# Patient Record
Sex: FEMALE | Race: ASIAN | NOT HISPANIC OR LATINO | Employment: FULL TIME | URBAN - METROPOLITAN AREA
[De-identification: names, ages, dates, MRNs, and addresses within clinical notes are randomized per-mention and may not be internally consistent; named-entity substitution may affect disease eponyms.]

---

## 2019-10-07 ENCOUNTER — HISTORICAL (OUTPATIENT)
Dept: ADMINISTRATIVE | Facility: HOSPITAL | Age: 19
End: 2019-10-07

## 2019-10-10 LAB — FINAL CULTURE: NORMAL

## 2022-04-10 ENCOUNTER — HISTORICAL (OUTPATIENT)
Dept: ADMINISTRATIVE | Facility: HOSPITAL | Age: 22
End: 2022-04-10

## 2022-04-26 VITALS
HEIGHT: 61 IN | SYSTOLIC BLOOD PRESSURE: 90 MMHG | DIASTOLIC BLOOD PRESSURE: 62 MMHG | WEIGHT: 85.56 LBS | BODY MASS INDEX: 16.15 KG/M2 | OXYGEN SATURATION: 98 %

## 2023-05-08 DIAGNOSIS — R42 DIZZINESS: Primary | ICD-10-CM

## 2023-05-08 DIAGNOSIS — R20.0 NUMBNESS: ICD-10-CM

## 2023-07-06 ENCOUNTER — PATIENT MESSAGE (OUTPATIENT)
Dept: NEUROLOGY | Facility: CLINIC | Age: 23
End: 2023-07-06
Payer: COMMERCIAL

## 2023-09-18 ENCOUNTER — OFFICE VISIT (OUTPATIENT)
Dept: NEUROLOGY | Facility: CLINIC | Age: 23
End: 2023-09-18
Payer: COMMERCIAL

## 2023-09-18 ENCOUNTER — LAB VISIT (OUTPATIENT)
Dept: LAB | Facility: HOSPITAL | Age: 23
End: 2023-09-18
Attending: PSYCHIATRY & NEUROLOGY
Payer: COMMERCIAL

## 2023-09-18 VITALS
DIASTOLIC BLOOD PRESSURE: 58 MMHG | HEART RATE: 84 BPM | HEIGHT: 62 IN | BODY MASS INDEX: 19.14 KG/M2 | SYSTOLIC BLOOD PRESSURE: 90 MMHG | WEIGHT: 104 LBS

## 2023-09-18 DIAGNOSIS — H54.7 UNSPECIFIED VISUAL LOSS: ICD-10-CM

## 2023-09-18 DIAGNOSIS — R20.0 NUMBNESS: ICD-10-CM

## 2023-09-18 DIAGNOSIS — R42 DIZZINESS AND GIDDINESS: Primary | ICD-10-CM

## 2023-09-18 DIAGNOSIS — R42 DIZZINESS: ICD-10-CM

## 2023-09-18 LAB
CK SERPL-CCNC: 45 U/L (ref 29–168)
ERYTHROCYTE [SEDIMENTATION RATE] IN BLOOD: 18 MM/HR (ref 0–20)
EST. AVERAGE GLUCOSE BLD GHB EST-MCNC: 85.3 MG/DL
FERRITIN SERPL-MCNC: 32.5 NG/ML (ref 4.63–204)
HBA1C MFR BLD: 4.6 %
HIV 1+2 AB+HIV1 P24 AG SERPL QL IA: NONREACTIVE
VIT B12 SERPL-MCNC: 1037 PG/ML (ref 213–816)

## 2023-09-18 PROCEDURE — 86255 FLUORESCENT ANTIBODY SCREEN: CPT

## 2023-09-18 PROCEDURE — 3074F SYST BP LT 130 MM HG: CPT | Mod: CPTII,S$GLB,, | Performed by: PSYCHIATRY & NEUROLOGY

## 2023-09-18 PROCEDURE — 82607 VITAMIN B-12: CPT

## 2023-09-18 PROCEDURE — 3078F PR MOST RECENT DIASTOLIC BLOOD PRESSURE < 80 MM HG: ICD-10-PCS | Mod: CPTII,S$GLB,, | Performed by: PSYCHIATRY & NEUROLOGY

## 2023-09-18 PROCEDURE — 86235 NUCLEAR ANTIGEN ANTIBODY: CPT

## 2023-09-18 PROCEDURE — 83519 RIA NONANTIBODY: CPT

## 2023-09-18 PROCEDURE — 3008F PR BODY MASS INDEX (BMI) DOCUMENTED: ICD-10-PCS | Mod: CPTII,S$GLB,, | Performed by: PSYCHIATRY & NEUROLOGY

## 2023-09-18 PROCEDURE — 1159F MED LIST DOCD IN RCRD: CPT | Mod: CPTII,S$GLB,, | Performed by: PSYCHIATRY & NEUROLOGY

## 2023-09-18 PROCEDURE — 87389 HIV-1 AG W/HIV-1&-2 AB AG IA: CPT

## 2023-09-18 PROCEDURE — 36415 COLL VENOUS BLD VENIPUNCTURE: CPT

## 2023-09-18 PROCEDURE — 3078F DIAST BP <80 MM HG: CPT | Mod: CPTII,S$GLB,, | Performed by: PSYCHIATRY & NEUROLOGY

## 2023-09-18 PROCEDURE — 86256 FLUORESCENT ANTIBODY TITER: CPT

## 2023-09-18 PROCEDURE — 82550 ASSAY OF CK (CPK): CPT

## 2023-09-18 PROCEDURE — 99999 PR PBB SHADOW E&M-EST. PATIENT-LVL IV: CPT | Mod: PBBFAC,,, | Performed by: PSYCHIATRY & NEUROLOGY

## 2023-09-18 PROCEDURE — 99999 PR PBB SHADOW E&M-EST. PATIENT-LVL IV: ICD-10-PCS | Mod: PBBFAC,,, | Performed by: PSYCHIATRY & NEUROLOGY

## 2023-09-18 PROCEDURE — 82728 ASSAY OF FERRITIN: CPT

## 2023-09-18 PROCEDURE — 1160F RVW MEDS BY RX/DR IN RCRD: CPT | Mod: CPTII,S$GLB,, | Performed by: PSYCHIATRY & NEUROLOGY

## 2023-09-18 PROCEDURE — 83036 HEMOGLOBIN GLYCOSYLATED A1C: CPT

## 2023-09-18 PROCEDURE — 86039 ANTINUCLEAR ANTIBODIES (ANA): CPT

## 2023-09-18 PROCEDURE — 3074F PR MOST RECENT SYSTOLIC BLOOD PRESSURE < 130 MM HG: ICD-10-PCS | Mod: CPTII,S$GLB,, | Performed by: PSYCHIATRY & NEUROLOGY

## 2023-09-18 PROCEDURE — 3044F PR MOST RECENT HEMOGLOBIN A1C LEVEL <7.0%: ICD-10-PCS | Mod: CPTII,S$GLB,, | Performed by: PSYCHIATRY & NEUROLOGY

## 2023-09-18 PROCEDURE — 1160F PR REVIEW ALL MEDS BY PRESCRIBER/CLIN PHARMACIST DOCUMENTED: ICD-10-PCS | Mod: CPTII,S$GLB,, | Performed by: PSYCHIATRY & NEUROLOGY

## 2023-09-18 PROCEDURE — 99205 OFFICE O/P NEW HI 60 MIN: CPT | Mod: S$GLB,,, | Performed by: PSYCHIATRY & NEUROLOGY

## 2023-09-18 PROCEDURE — 3044F HG A1C LEVEL LT 7.0%: CPT | Mod: CPTII,S$GLB,, | Performed by: PSYCHIATRY & NEUROLOGY

## 2023-09-18 PROCEDURE — 1159F PR MEDICATION LIST DOCUMENTED IN MEDICAL RECORD: ICD-10-PCS | Mod: CPTII,S$GLB,, | Performed by: PSYCHIATRY & NEUROLOGY

## 2023-09-18 PROCEDURE — 82525 ASSAY OF COPPER: CPT

## 2023-09-18 PROCEDURE — 99205 PR OFFICE/OUTPT VISIT, NEW, LEVL V, 60-74 MIN: ICD-10-PCS | Mod: S$GLB,,, | Performed by: PSYCHIATRY & NEUROLOGY

## 2023-09-18 PROCEDURE — 85652 RBC SED RATE AUTOMATED: CPT

## 2023-09-18 PROCEDURE — 3008F BODY MASS INDEX DOCD: CPT | Mod: CPTII,S$GLB,, | Performed by: PSYCHIATRY & NEUROLOGY

## 2023-09-18 RX ORDER — DEXTROAMPHETAMINE SACCHARATE, AMPHETAMINE ASPARTATE MONOHYDRATE, DEXTROAMPHETAMINE SULFATE AND AMPHETAMINE SULFATE 2.5; 2.5; 2.5; 2.5 MG/1; MG/1; MG/1; MG/1
15 CAPSULE, EXTENDED RELEASE ORAL 2 TIMES DAILY
COMMUNITY

## 2023-09-18 RX ORDER — LANOLIN ALCOHOL/MO/W.PET/CERES
400 CREAM (GRAM) TOPICAL DAILY
COMMUNITY
End: 2023-09-25

## 2023-09-18 RX ORDER — MIRTAZAPINE 7.5 MG/1
15 TABLET, FILM COATED ORAL NIGHTLY
COMMUNITY
End: 2023-09-25

## 2023-09-18 NOTE — PROGRESS NOTES
Chief Complaint   Patient presents with    Dizziness/numbness     NP: Referred by Dr. Ward Akers for neuro consult to evaluate for dizziness and numbness: Lately, has been having episodes of dizziness on a daily basis. This year fainted at 3 times. Randomly hands and legs get numb lasting for about a minute or two. Has been getting nerve twitches in arms and feet.         This is a 23 y.o. female  here for dizziness.  Patient reports chronic sensation of vertigo that started in March.  Symptoms were gradual onset.  No specific trigger at that time.  Symptoms are not positional.  She feels a sensation of movement when she is upright sitting or lying.  She also has had an episode of fainting while taking a shower earlier this year in April.  She recalls becoming lightheaded and seeing spots and then passing out in the shower.  When trying to fix the shower curtain she feels that she passed out again and when getting out of the shower.  She did not urinate on herself.  She intermittently has brief feelings of numbness in an arm or leg or in her feet.  This only lasts for a few seconds to minutes and then usually goes away.  She is seen both Cardiology and the diagnosis of POTS was discussed.  She underwent a workup which was essentially normal.  Also saw ENT and did vestibular testing which she tells me was normal.  She underwent an MRI of the brain while she was in the ER in [place] which was reportedly normal as well.  She takes Zoloft and then takes Remeron for sleep.  She was previously on Ambien but has since stopped that medication.  Takes Adderall and has been taking for some time prior to dizziness starting.  Denies any ringing in the ear.  But does report intermittent hearing loss.  However reports audiology testing was normal.  Denies headaches        Medication List with Changes/Refills   Current Medications    CLONAZEPAM (KLONOPIN) 0.5 MG TABLET    Take 0.5 mg by mouth every evening.     DEXTROAMPHETAMINE-AMPHETAMINE (ADDERALL XR) 10 MG 24 HR CAPSULE    Take 15 mg by mouth 2 (two) times a day.    ETONOGESTREL (NEXPLANON SDRM)    by Subdermal route.    LEVOCETIRIZINE (XYZAL) 5 MG TABLET    Take 5 mg by mouth every evening.    MAGNESIUM OXIDE (MAG-OX) 400 MG (241.3 MG MAGNESIUM) TABLET    Take 400 mg by mouth once daily.    MIRTAZAPINE (REMERON) 7.5 MG TAB    Take 15 mg by mouth every evening.    SERTRALINE HCL (SERTRALINE ORAL)    Take 150 mg by mouth once daily.    ZOLPIDEM (AMBIEN) 5 MG TAB    Take 5 mg by mouth nightly as needed.        Past Surgical History:   Procedure Laterality Date    TEAR DUCT SURGERY          Past Medical History:   Diagnosis Date    ADHD     Anxiety disorder, unspecified     Depression     Hashimoto's disease     Hyperinsulinemia     PTSD (post-traumatic stress disorder)     Thyroiditis, unspecified         Family History   Problem Relation Age of Onset    Hypertension Mother     Hyperlipidemia Father     Diabetes Father     Alcohol abuse Father         Social History     Socioeconomic History    Marital status: Single   Tobacco Use    Smoking status: Never    Smokeless tobacco: Never   Substance and Sexual Activity    Alcohol use: Not Currently    Drug use: Never          Review of Systems  Review of Systems   Constitutional: Negative for appetite change.   HENT: Negative for sinus pressure and sore throat.    Eyes: Negative for visual disturbance.   Respiratory: Negative for cough and shortness of breath.    Cardiovascular: Negative for chest pain.   Gastrointestinal: Negative for diarrhea and nausea.   Endocrine: Negative for cold intolerance and heat intolerance.   Genitourinary: Negative for dysuria.   Musculoskeletal: Negative for arthralgias and myalgias.   Skin: Negative for rash.   Allergic/Immunologic: Negative for immunocompromised state.   Neurological:        See HPI   Hematological: Does not bruise/bleed easily.   Psychiatric/Behavioral: Negative for  hallucinations.      General: alert and oriented, no acute distress, no audible wheezes, pulse intact, no edema    Vitals:    09/18/23 0803   BP: (!) 90/58   Pulse: 84        Cognition and Comprehension  Speech and language intact  Follows commands  Speech fluent  Attention intact  Memory for recent events intact from history taking  Affect pleasant  Fund of knowledge adequate    Cranial nerves  II. Optic: Visual fields full to confrontation both eyes  III, IV, VI. Oculomotor: Intact, Pupils equal, round and reactive to light, no nystagmus  V. Trigeminal: sensation to light touch normal  VII. No facial asymmetry or facial weakness  VIII. Hearing intact to spoken voice  IX/X. Glossopharyngeal/Vagus: Voice normal, palate rises symmetrically  XI. Axillary: Shoulder shrug normal  XII. Hypoglossal: Intact    Muscle Strength and Tone  Normal upper extremity tone  Normal lower extremity tone  Normal upper extremity strength  Normal lower extremity strength    Sensation  Intact to light touch and temperature    Reflexes  Normal and symmetric    Coordination and Gait  Finger to nose normal  Gait normal       Charmaine was seen today for dizziness/numbness.    Diagnoses and all orders for this visit:    Dizziness and giddiness  -     MRA Brain without contrast; Future    Dizziness  -     Ambulatory referral/consult to Neurology  -     HIV 1/2 Ag/Ab (4th Gen); Future  -     RACHEL IgG by IFA; Future  -     CK; Future  -     Ferritin; Future  -     Hemoglobin A1C; Future  -     Copper, Serum; Future  -     ANTI-SSA + ANTI-SSB; Future  -     Sedimentation rate; Future  -     Vitamin B12; Future  -     [place] GENERIC ORDERABLE DYS2 (dysautonomia autoimmune eval); Future    Numbness  -     Ambulatory referral/consult to Neurology    Unspecified visual loss  -     MRA Brain without contrast; Future     Neurologic exam is normal.  Reportedly MRI normal.  Her history and normal workup and exam rules out typical neurologic causes of chronic  dizziness like vestibular migraine and multiple sclerosis.  Given family history of cerebral aneurysm, we will check MRA of the brain, labs for neuropathy (although no signs of neuropathy on exam). Interested in neuro-otology referral in [place] (works there and here )

## 2023-09-19 LAB
ANA SER QL HEP2 SUBST: NORMAL
COPPER SERPL-MCNC: 99 MCG/DL (ref 77–206)

## 2023-09-20 LAB
SSA(RO) AB QUANT (OHS): <0.4 U/ML
SSB(LA) AB QUANT (OHS): <0.4 U/ML

## 2023-09-21 ENCOUNTER — TELEPHONE (OUTPATIENT)
Dept: NEUROLOGY | Facility: CLINIC | Age: 23
End: 2023-09-21
Payer: COMMERCIAL

## 2023-09-21 DIAGNOSIS — I67.1 INTRACRANIAL ANEURYSM: Primary | ICD-10-CM

## 2023-09-25 ENCOUNTER — OFFICE VISIT (OUTPATIENT)
Dept: NEUROLOGY | Facility: CLINIC | Age: 23
End: 2023-09-25
Payer: COMMERCIAL

## 2023-09-25 VITALS
BODY MASS INDEX: 19.14 KG/M2 | HEIGHT: 62 IN | WEIGHT: 104 LBS | SYSTOLIC BLOOD PRESSURE: 110 MMHG | DIASTOLIC BLOOD PRESSURE: 68 MMHG

## 2023-09-25 DIAGNOSIS — I67.1 INTRACRANIAL ANEURYSM: Primary | ICD-10-CM

## 2023-09-25 DIAGNOSIS — R42 DIZZINESS, NONSPECIFIC: ICD-10-CM

## 2023-09-25 DIAGNOSIS — Q79.60 EDS (EHLERS-DANLOS SYNDROME): Primary | ICD-10-CM

## 2023-09-25 DIAGNOSIS — Z82.49 FAMILY HISTORY OF CEREBRAL ANEURYSM: ICD-10-CM

## 2023-09-25 PROCEDURE — 3008F BODY MASS INDEX DOCD: CPT | Mod: CPTII,S$GLB,, | Performed by: PSYCHIATRY & NEUROLOGY

## 2023-09-25 PROCEDURE — 1159F PR MEDICATION LIST DOCUMENTED IN MEDICAL RECORD: ICD-10-PCS | Mod: CPTII,S$GLB,, | Performed by: PSYCHIATRY & NEUROLOGY

## 2023-09-25 PROCEDURE — 3044F PR MOST RECENT HEMOGLOBIN A1C LEVEL <7.0%: ICD-10-PCS | Mod: CPTII,S$GLB,, | Performed by: PSYCHIATRY & NEUROLOGY

## 2023-09-25 PROCEDURE — 3074F PR MOST RECENT SYSTOLIC BLOOD PRESSURE < 130 MM HG: ICD-10-PCS | Mod: CPTII,S$GLB,, | Performed by: PSYCHIATRY & NEUROLOGY

## 2023-09-25 PROCEDURE — 1160F RVW MEDS BY RX/DR IN RCRD: CPT | Mod: CPTII,S$GLB,, | Performed by: PSYCHIATRY & NEUROLOGY

## 2023-09-25 PROCEDURE — 99999 PR PBB SHADOW E&M-EST. PATIENT-LVL III: CPT | Mod: PBBFAC,,, | Performed by: PSYCHIATRY & NEUROLOGY

## 2023-09-25 PROCEDURE — 3078F PR MOST RECENT DIASTOLIC BLOOD PRESSURE < 80 MM HG: ICD-10-PCS | Mod: CPTII,S$GLB,, | Performed by: PSYCHIATRY & NEUROLOGY

## 2023-09-25 PROCEDURE — 3074F SYST BP LT 130 MM HG: CPT | Mod: CPTII,S$GLB,, | Performed by: PSYCHIATRY & NEUROLOGY

## 2023-09-25 PROCEDURE — 99215 PR OFFICE/OUTPT VISIT, EST, LEVL V, 40-54 MIN: ICD-10-PCS | Mod: S$GLB,,, | Performed by: PSYCHIATRY & NEUROLOGY

## 2023-09-25 PROCEDURE — 3078F DIAST BP <80 MM HG: CPT | Mod: CPTII,S$GLB,, | Performed by: PSYCHIATRY & NEUROLOGY

## 2023-09-25 PROCEDURE — 99215 OFFICE O/P EST HI 40 MIN: CPT | Mod: S$GLB,,, | Performed by: PSYCHIATRY & NEUROLOGY

## 2023-09-25 PROCEDURE — 99999 PR PBB SHADOW E&M-EST. PATIENT-LVL III: ICD-10-PCS | Mod: PBBFAC,,, | Performed by: PSYCHIATRY & NEUROLOGY

## 2023-09-25 PROCEDURE — 1160F PR REVIEW ALL MEDS BY PRESCRIBER/CLIN PHARMACIST DOCUMENTED: ICD-10-PCS | Mod: CPTII,S$GLB,, | Performed by: PSYCHIATRY & NEUROLOGY

## 2023-09-25 PROCEDURE — 3044F HG A1C LEVEL LT 7.0%: CPT | Mod: CPTII,S$GLB,, | Performed by: PSYCHIATRY & NEUROLOGY

## 2023-09-25 PROCEDURE — 1159F MED LIST DOCD IN RCRD: CPT | Mod: CPTII,S$GLB,, | Performed by: PSYCHIATRY & NEUROLOGY

## 2023-09-25 PROCEDURE — 3008F PR BODY MASS INDEX (BMI) DOCUMENTED: ICD-10-PCS | Mod: CPTII,S$GLB,, | Performed by: PSYCHIATRY & NEUROLOGY

## 2023-09-25 RX ORDER — DEXTROMETHORPHAN HYDROBROMIDE, GUAIFENESIN, PHENYLEPHRINE HYDROCHLORIDE 17.5; 400; 1 MG/1; MG/1; MG/1
1 TABLET ORAL 3 TIMES DAILY
COMMUNITY
Start: 2023-09-06 | End: 2023-09-25

## 2023-09-25 RX ORDER — MIRTAZAPINE 15 MG/1
22.5 TABLET, FILM COATED ORAL NIGHTLY
COMMUNITY
Start: 2023-08-12

## 2023-09-25 RX ORDER — MULTIVITAMIN
1 TABLET ORAL DAILY
COMMUNITY

## 2023-09-25 NOTE — PROGRESS NOTES
Neurology Office Visit  Neurology  HPI:    Charmaine Mariano is a 23 y.o. female who is referred to vascular neurology clinic after she was noted to have multiple aneurysms on a recent MRA which was performed due to a significant family history of aneurysms on mothers side. She was referred by Dr Briceño who was seeing her for chronic dizziness/vertigo and passing out spells. She states that vertigo is present irrespective of the position or activity. She has not had passing out spells since April. She was evaluated in [place] when she had multiple spells of passing out and vertigo. Since then, she has seen ENT and Cardiology and underwent extensive testing and ruled out ear and cardiac etiology for her spells including BPPV, POTS. She is currently undergoing work up with Dr Briceño for her vertigo. She reports occasional headaches but denies daily headaches or medications. She states that she occasionally has tingling in bilateral arms/legs but denies any weakness, numbness, vision or speech changes. She denies smoking, drug use and takes adderall on a daily basis along with zoloft and mirtazapine.     She reports significant family history of ruptured intracranial aneurysms on mother's side including mother, maternal aunt, maternal uncle. One of her cousin is diagnosed with Jordana Danlos syndrome.  She has a history of autoimmune thyroiditis but does not take any medications. She has an endocrinologist and a psychiatrist who manage her other conditions.     ROS:  Review of Systems   Constitutional:  Negative for malaise/fatigue and weight loss.   Eyes:  Positive for blurred vision. Negative for double vision.   Neurological:  Positive for sensory change and headaches. Negative for speech change and focal weakness.   Psychiatric/Behavioral:  Negative for memory loss. The patient is nervous/anxious.         Past Medical History:   Diagnosis Date    ADHD     Anxiety disorder, unspecified     Depression     Hashimoto's  disease     Hyperinsulinemia     PTSD (post-traumatic stress disorder)     Thyroiditis, unspecified      Past Surgical History:   Procedure Laterality Date    TEAR DUCT SURGERY       Family History   Problem Relation Age of Onset    Hypertension Mother     Hyperlipidemia Father     Diabetes Father     Alcohol abuse Father      Review of patient's allergies indicates:   Allergen Reactions    Bactrim [sulfamethoxazole-trimethoprim]        Current Outpatient Medications:     dextroamphetamine-amphetamine (ADDERALL XR) 10 MG 24 hr capsule, Take 15 mg by mouth 2 (two) times a day., Disp: , Rfl:     etonogestrel (NEXPLANON SDRM), by Subdermal route., Disp: , Rfl:     mirtazapine (REMERON) 15 MG tablet, Take 22.5 mg by mouth every evening., Disp: , Rfl:     multivitamin (ONE DAILY MULTIVITAMIN) per tablet, Take 1 tablet by mouth once daily., Disp: , Rfl:     sertraline HCl (SERTRALINE ORAL), Take 150 mg by mouth once daily., Disp: , Rfl:   Outpatient Medications Marked as Taking for the 9/25/23 encounter (Office Visit) with Eriberto Celis MD   Medication Sig Dispense Refill    dextroamphetamine-amphetamine (ADDERALL XR) 10 MG 24 hr capsule Take 15 mg by mouth 2 (two) times a day.      etonogestrel (NEXPLANON SDRM) by Subdermal route.      mirtazapine (REMERON) 15 MG tablet Take 22.5 mg by mouth every evening.      multivitamin (ONE DAILY MULTIVITAMIN) per tablet Take 1 tablet by mouth once daily.      sertraline HCl (SERTRALINE ORAL) Take 150 mg by mouth once daily.       Social History     Tobacco Use    Smoking status: Never    Smokeless tobacco: Never   Substance Use Topics    Alcohol use: Not Currently    Drug use: Never         Vitals:    09/25/23 1329   BP: 110/68       Physical Exam:  HEENT NC/AT  CV RRR, no tenderness  Resp clear without dyspnea  GI soft  Ext no edema. Hypermobile joints including thumb.     Neuro:  Alert & oriented x 3  EOMI, PERRLA, visual field intact in all 4 quadrants, no nystagmus. Diplopia  (inconsistent) on right gaze and upper gaze but disappears on left gaze.   Face symmetric, speech clear and fluent, facial sensation equal bilaterally  Tongue midline  Strength 5/5 in bilateral upper and lower extremities   Sensation intact and equal bilaterally  Gait steady and balanced     Imaging:  Reviewed MRA head     Assessment:   Ms Mariano is a pleasant 23 year female who is referred to vascular neurology clinic due to a recent finding of intracranial aneurysms seen on MRA head. She has a significant family history of ruptured aneurysms and Jordana Danlos syndrome and is currently undergoing work up for vertigo and passing out spells with Dr Briceño.     Her examination is non focal except for an inconsistent diplopia on right and upper gaze.     I discussed and reviewed the MRA findings. I also discussed the natural history of aneurysms and high risks features for rupture including size, location, shape and family history. I also discussed external factors such as HTN, smoking, drug use as contributing factors to risk of aneurysm rupture. I discussed the need for a diagnostic cerebral angiogram to further evaluate the intracranial aneurysms seen on MRA and need for close surveillance. I discussed the risks of stroke, hemorrhage, pain with the angiogram and plan of performing a DSA next week.       Plan:   - DSA next week  - genetics referral to look for hereditary conditions predisposing her to aneurysms such as Jordana Danlos syndrome    Eriberto Celis MD  Vascular and Interventional Neurology

## 2023-09-29 LAB — MAYO GENERIC ORDERABLE RESULT: NORMAL

## 2023-10-02 ENCOUNTER — TELEPHONE (OUTPATIENT)
Dept: NEUROLOGY | Facility: CLINIC | Age: 23
End: 2023-10-02

## 2023-10-02 NOTE — PROGRESS NOTES
Labs normal overall, ferritin (iron stores) were relatively low. Sometimes this can cause restless leg symptoms. If she is not having these sx, does not need to supplement

## 2023-10-02 NOTE — TELEPHONE ENCOUNTER
----- Message from Akosua Briceño MD sent at 10/2/2023  8:21 AM CDT -----  Labs normal overall, ferritin (iron stores) were relatively low. Sometimes this can cause restless leg symptoms. If she is not having these sx, does not need to supplement

## 2023-10-02 NOTE — TELEPHONE ENCOUNTER
Pt informed of overall normal labs her ferritin was relatively low Pt denies restless leg type symptoms requesting labs to be faxed to PCP and endocrinologist will fax to care teams

## 2023-10-03 ENCOUNTER — TELEPHONE (OUTPATIENT)
Dept: NEUROLOGY | Facility: CLINIC | Age: 23
End: 2023-10-03
Payer: COMMERCIAL

## 2023-10-03 NOTE — TELEPHONE ENCOUNTER
Received call from Dr. Celina Amezquita's office (genetics) regarding pt's referral.  They are unable to accept referral as they specialize in pediatric genetics.  Pt has also been referred to Dr. Chi Yu for genetics, referral is pending acceptance.

## 2023-10-04 ENCOUNTER — HOSPITAL ENCOUNTER (OUTPATIENT)
Dept: INTERVENTIONAL RADIOLOGY/VASCULAR | Facility: HOSPITAL | Age: 23
Discharge: HOME | End: 2023-10-04
Attending: PSYCHIATRY & NEUROLOGY | Admitting: PSYCHIATRY & NEUROLOGY
Payer: COMMERCIAL

## 2023-10-04 VITALS
DIASTOLIC BLOOD PRESSURE: 55 MMHG | TEMPERATURE: 98 F | HEART RATE: 79 BPM | BODY MASS INDEX: 19.19 KG/M2 | SYSTOLIC BLOOD PRESSURE: 100 MMHG | HEIGHT: 62 IN | WEIGHT: 104.25 LBS | OXYGEN SATURATION: 96 %

## 2023-10-04 DIAGNOSIS — I67.1 INTRACRANIAL ANEURYSM: ICD-10-CM

## 2023-10-04 DIAGNOSIS — Q79.60 EDS (EHLERS-DANLOS SYNDROME): Primary | ICD-10-CM

## 2023-10-04 LAB
ANION GAP SERPL CALC-SCNC: 5 MEQ/L
B-HCG UR QL: NEGATIVE
BASOPHILS # BLD AUTO: 0.04 X10(3)/MCL
BASOPHILS NFR BLD AUTO: 0.9 %
BUN SERPL-MCNC: 13.9 MG/DL (ref 7–18.7)
CALCIUM SERPL-MCNC: 9.2 MG/DL (ref 8.4–10.2)
CHLORIDE SERPL-SCNC: 109 MMOL/L (ref 98–107)
CO2 SERPL-SCNC: 25 MMOL/L (ref 22–29)
CREAT SERPL-MCNC: 0.73 MG/DL (ref 0.55–1.02)
CREAT/UREA NIT SERPL: 19
CTP QC/QA: YES
EOSINOPHIL # BLD AUTO: 0.03 X10(3)/MCL (ref 0–0.9)
EOSINOPHIL NFR BLD AUTO: 0.7 %
ERYTHROCYTE [DISTWIDTH] IN BLOOD BY AUTOMATED COUNT: 12.4 % (ref 11.5–17)
GFR SERPLBLD CREATININE-BSD FMLA CKD-EPI: >60 MLS/MIN/1.73/M2
GLUCOSE SERPL-MCNC: 87 MG/DL (ref 74–100)
HCT VFR BLD AUTO: 40.1 % (ref 37–47)
HGB BLD-MCNC: 13.8 G/DL (ref 12–16)
IMM GRANULOCYTES # BLD AUTO: 0.01 X10(3)/MCL (ref 0–0.04)
IMM GRANULOCYTES NFR BLD AUTO: 0.2 %
INR PPP: 1
LYMPHOCYTES # BLD AUTO: 1.05 X10(3)/MCL (ref 0.6–4.6)
LYMPHOCYTES NFR BLD AUTO: 23.1 %
MCH RBC QN AUTO: 33.9 PG (ref 27–31)
MCHC RBC AUTO-ENTMCNC: 34.4 G/DL (ref 33–36)
MCV RBC AUTO: 98.5 FL (ref 80–94)
MONOCYTES # BLD AUTO: 0.31 X10(3)/MCL (ref 0.1–1.3)
MONOCYTES NFR BLD AUTO: 6.8 %
NEUTROPHILS # BLD AUTO: 3.1 X10(3)/MCL (ref 2.1–9.2)
NEUTROPHILS NFR BLD AUTO: 68.3 %
NRBC BLD AUTO-RTO: 0 %
PLATELET # BLD AUTO: 239 X10(3)/MCL (ref 130–400)
PMV BLD AUTO: 10.2 FL (ref 7.4–10.4)
POTASSIUM SERPL-SCNC: 4.3 MMOL/L (ref 3.5–5.1)
PROTHROMBIN TIME: 13 SECONDS (ref 12.5–14.5)
RBC # BLD AUTO: 4.07 X10(6)/MCL (ref 4.2–5.4)
SODIUM SERPL-SCNC: 139 MMOL/L (ref 136–145)
WBC # SPEC AUTO: 4.54 X10(3)/MCL (ref 4.5–11.5)

## 2023-10-04 PROCEDURE — 36227 PLACE CATH XTRNL CAROTID: CPT | Mod: 50,,, | Performed by: PSYCHIATRY & NEUROLOGY

## 2023-10-04 PROCEDURE — 36224 PLACE CATH CAROTD ART: CPT | Mod: 50

## 2023-10-04 PROCEDURE — 99153 MOD SED SAME PHYS/QHP EA: CPT | Performed by: PSYCHIATRY & NEUROLOGY

## 2023-10-04 PROCEDURE — 36226 PLACE CATH VERTEBRAL ART: CPT | Mod: 51,50,, | Performed by: PSYCHIATRY & NEUROLOGY

## 2023-10-04 PROCEDURE — 36227 PLACE CATH XTRNL CAROTID: CPT | Mod: 50 | Performed by: PSYCHIATRY & NEUROLOGY

## 2023-10-04 PROCEDURE — C1894 INTRO/SHEATH, NON-LASER: HCPCS | Performed by: PSYCHIATRY & NEUROLOGY

## 2023-10-04 PROCEDURE — 76937 US GUIDE VASCULAR ACCESS: CPT | Mod: 26,,, | Performed by: PSYCHIATRY & NEUROLOGY

## 2023-10-04 PROCEDURE — 25000003 PHARM REV CODE 250: Performed by: PSYCHIATRY & NEUROLOGY

## 2023-10-04 PROCEDURE — 99152 MOD SED SAME PHYS/QHP 5/>YRS: CPT | Mod: ,,, | Performed by: PSYCHIATRY & NEUROLOGY

## 2023-10-04 PROCEDURE — 80048 BASIC METABOLIC PNL TOTAL CA: CPT | Performed by: PSYCHIATRY & NEUROLOGY

## 2023-10-04 PROCEDURE — 36226 PLACE CATH VERTEBRAL ART: CPT | Mod: 50 | Performed by: PSYCHIATRY & NEUROLOGY

## 2023-10-04 PROCEDURE — 36224 PLACE CATH CAROTD ART: CPT | Mod: 50,,, | Performed by: PSYCHIATRY & NEUROLOGY

## 2023-10-04 PROCEDURE — 85025 COMPLETE CBC W/AUTO DIFF WBC: CPT | Performed by: PSYCHIATRY & NEUROLOGY

## 2023-10-04 PROCEDURE — 81025 URINE PREGNANCY TEST: CPT | Performed by: PSYCHIATRY & NEUROLOGY

## 2023-10-04 PROCEDURE — 36224 PR ANGIO INTRCRNL ART +/- CERVIOCEREBRAL ARCH, INTRNL CAROTID ART, SELECTV CATH ,S&I: ICD-10-PCS | Mod: 50,,, | Performed by: PSYCHIATRY & NEUROLOGY

## 2023-10-04 PROCEDURE — 36227 PR ANGIO XTRNL CAROTD CIRC, XTRNL CAROTID, SELECTV CATH S&I: ICD-10-PCS | Mod: 50,,, | Performed by: PSYCHIATRY & NEUROLOGY

## 2023-10-04 PROCEDURE — C1769 GUIDE WIRE: HCPCS | Performed by: PSYCHIATRY & NEUROLOGY

## 2023-10-04 PROCEDURE — 99152 MOD SED SAME PHYS/QHP 5/>YRS: CPT | Performed by: PSYCHIATRY & NEUROLOGY

## 2023-10-04 PROCEDURE — C1887 CATHETER, GUIDING: HCPCS | Performed by: PSYCHIATRY & NEUROLOGY

## 2023-10-04 PROCEDURE — 99152 PR MOD CONSCIOUS SEDATION, SAME PHYS, 5+ YRS, FIRST 15 MIN: ICD-10-PCS | Mod: ,,, | Performed by: PSYCHIATRY & NEUROLOGY

## 2023-10-04 PROCEDURE — 25500020 PHARM REV CODE 255: Performed by: PSYCHIATRY & NEUROLOGY

## 2023-10-04 PROCEDURE — 63600175 PHARM REV CODE 636 W HCPCS: Performed by: PSYCHIATRY & NEUROLOGY

## 2023-10-04 PROCEDURE — 36226 PR ANGIO VERTEBRAL ARTERY +/- CERVIOCEREBRAL ARCH, VERTEBRAL ART, SELECTV CATH,S&I: ICD-10-PCS | Mod: 51,50,, | Performed by: PSYCHIATRY & NEUROLOGY

## 2023-10-04 PROCEDURE — 76937 PR  US GUIDE, VASCULAR ACCESS: ICD-10-PCS | Mod: 26,,, | Performed by: PSYCHIATRY & NEUROLOGY

## 2023-10-04 PROCEDURE — 85610 PROTHROMBIN TIME: CPT | Performed by: PSYCHIATRY & NEUROLOGY

## 2023-10-04 RX ORDER — MIDAZOLAM HYDROCHLORIDE 1 MG/ML
INJECTION INTRAMUSCULAR; INTRAVENOUS
Status: COMPLETED | OUTPATIENT
Start: 2023-10-04 | End: 2023-10-04

## 2023-10-04 RX ORDER — BUTALBITAL, ACETAMINOPHEN AND CAFFEINE 50; 325; 40 MG/1; MG/1; MG/1
1 TABLET ORAL ONCE
Status: COMPLETED | OUTPATIENT
Start: 2023-10-04 | End: 2023-10-04

## 2023-10-04 RX ORDER — SODIUM CHLORIDE 9 MG/ML
INJECTION, SOLUTION INTRAVENOUS CONTINUOUS
Status: ACTIVE | OUTPATIENT
Start: 2023-10-04 | End: 2023-10-04

## 2023-10-04 RX ORDER — FENTANYL CITRATE 50 UG/ML
INJECTION, SOLUTION INTRAMUSCULAR; INTRAVENOUS
Status: COMPLETED | OUTPATIENT
Start: 2023-10-04 | End: 2023-10-04

## 2023-10-04 RX ORDER — ONDANSETRON 4 MG/1
8 TABLET, ORALLY DISINTEGRATING ORAL EVERY 8 HOURS PRN
Status: DISCONTINUED | OUTPATIENT
Start: 2023-10-04 | End: 2023-10-05 | Stop reason: HOSPADM

## 2023-10-04 RX ORDER — LIDOCAINE HYDROCHLORIDE 20 MG/ML
INJECTION, SOLUTION INFILTRATION; PERINEURAL
Status: COMPLETED | OUTPATIENT
Start: 2023-10-04 | End: 2023-10-04

## 2023-10-04 RX ORDER — SPIRONOLACTONE 25 MG/1
25 TABLET ORAL DAILY
COMMUNITY

## 2023-10-04 RX ORDER — SODIUM CHLORIDE 9 MG/ML
INJECTION, SOLUTION INTRAVENOUS ONCE
Status: DISCONTINUED | OUTPATIENT
Start: 2023-10-04 | End: 2023-10-05 | Stop reason: HOSPADM

## 2023-10-04 RX ORDER — ACETAMINOPHEN 325 MG/1
650 TABLET ORAL EVERY 4 HOURS PRN
Status: DISCONTINUED | OUTPATIENT
Start: 2023-10-04 | End: 2023-10-05 | Stop reason: HOSPADM

## 2023-10-04 RX ADMIN — BUTALBITAL, ACETAMINOPHEN, AND CAFFEINE 1 TABLET: 50; 325; 40 TABLET ORAL at 01:10

## 2023-10-04 RX ADMIN — FENTANYL CITRATE 50 MCG: 50 INJECTION INTRAMUSCULAR; INTRAVENOUS at 11:10

## 2023-10-04 RX ADMIN — IOPAMIDOL 100 ML: 755 INJECTION, SOLUTION INTRAVENOUS at 11:10

## 2023-10-04 RX ADMIN — LIDOCAINE HYDROCHLORIDE 5 ML: 20 INJECTION, SOLUTION INFILTRATION; PERINEURAL at 11:10

## 2023-10-04 RX ADMIN — ONDANSETRON 8 MG: 4 TABLET, ORALLY DISINTEGRATING ORAL at 12:10

## 2023-10-04 RX ADMIN — MIDAZOLAM 1 MG: 1 INJECTION INTRAMUSCULAR; INTRAVENOUS at 11:10

## 2023-10-04 RX ADMIN — ACETAMINOPHEN 650 MG: 325 TABLET ORAL at 12:10

## 2023-10-04 RX ADMIN — SODIUM CHLORIDE: 9 INJECTION, SOLUTION INTRAVENOUS at 01:10

## 2023-10-04 NOTE — PLAN OF CARE
Pt c/o severe headache on right side. Pt lying flat in bed in tears. States pain is 9, on 0-10 scale. Called for Dr. Celis, spoke to Guanaco RN, new orders noted to administer fiorcet. Dr. eClis assessed pt at bedside.     Also asked Dr. Celis for clarification of order for 6 hour bedrest. Dr. Celis states he wants patient flat for 2 hours and bedrest for 6 hours total.

## 2023-10-04 NOTE — OP NOTE
[facility]                       [address]                      [place] [address]     PATIENT NAME:Charmaine Mariano    YOB: 2000      DATE OF SURGERY:10/4/2023      SURGEON:  Eriberto Celis MD     PREOPERATIVE DIAGNOSIS:  concern for intracranial aneurysms     POSTOPERATIVE DIAGNOSIS:  no evidence of aneurysm, AVM, AV fistula, stenosis.     PROCEDURE PERFORMED:    Cerebral angiogram with catheter insertion in the following arteries:  Right common carotid, right internal carotid, right external carotid artery, right subclavian, right vertebral, left common carotid, left internal carotid, left external carotid artery, left subclavian, left vertebral.  Interpretation of images  3D cerebral angiogram of both carotid arteries injection with interpretation of films on a separate workstation  Ultrasound-guided right femoral artery access  Moderate conscious sedation for 60 minutes     LEVEL OF SEDATION:  Conscious sedation.  Sedation administered by independent   trained observer under attending supervision with continuous monitoring of the   patient's level of consciousness and physiologic status.     TOTAL INTRASERVICE SEDATION TIME:  60 minutes.     COMPLICATIONS:  None.    APPROACH:  Right femoral artery      VESSELS SELECTIVELY CATHETERIZED:   Right common carotid artery   Right internal carotid artery   Right external carotid artery   Left common carotid artery  Left internal carotid artery   Left external carotid artery   Right subclavian artery   Right vertebral artery  Left subclavian artery   Left vertebral artery    DEVICES USED:  5 Fr short sheath  5 Fr Angled catheter  035 glidewire  Micropuncture kit       INDICATION:  23 y.o. years old female with a strong family history of aneurysms presents for a cerebral angiogram after her MRA reported concern for intracranial aneurysms. She has vertigo and history of passing out spells and occasional  headches.     DETAILED DESCRIPTION:      Risks/benefits were thoroughly reviewed with patient/family prior to the procedure.  Risks inclusive but not limited to death, stroke, contrast reaction/nephropathy, access/vascular injury, and other unforeseen risks.  Patient/family provided informed consent.  Patient supine on the angio table, groin prepped and draped in routine fashion.  Moderate conscious sedation was administered along with local anesthesia.    Under the ultrasound guidance, the right femoral artery was accessed using a micropuncture kit and an arterial sheath was placed using the modified Seldinger technique.  Diagnostic catheter telescoping over the 035 glidewire advanced to the arch and double flushed.  Enumerated vessels were then selectively catheterized and angiograms obtained as listed below.  Multiple cervical and intracranial runs were obtained in AP and lateral projection.  The films were reviewed and determined to be of good diagnostic quality.  Three-dimensional angiography was performed, the source data transferred to a separate workstation and personally processed by the angiographer. Specific views were then chosen that would best showed the lesion on multiple planes.  The selected coordinates were then transferred to the biplane angiographic suite and used to obtain confirmatory angiographic views of the lesion.  Diagnostic catheter and sheath were then withdrawn and hemostasis was obtained with the above closure device.  No immediate complications were noted.  Patient tolerated the procedure well.    Angiograms performed and findings:    Right femoral artery:  Sheath placement in the right common femoral artery above the femoral bifurcation.  There is no evidence of stenosis, dissection, atherosclerosis or contrast extravasation at the site of puncture.  Right internal carotid artery - cerebral & 3D angiogram:  The distal cervical, petrous, cavernous, supraclinoid segments of the right  ICA appear patent.  There is a posterior communicating artery supplying the PCA territory.  There is normal flow and branching noted in right DANIEL and MCA territory.  The capillary and venous phases appear unremarkable.NO evidence of aneurysm, AV fistula, AVM.   Right external carotid artery - cerebral:  The right external carotid artery and its branches appear unremarkable.  There is no evidence of AV fistula.  Right vertebral artery - cerebral:  Right vertebral artery is co-dominant.  Unremarkable distal cervical and intracranial segments of the right vertebral artery.  Right PICA, bilateral AICA, SCA, PCA are visualized.  The basilar artery appears patent.  There is normal capillary and venous phase.    Left internal carotid artery - cerebral:  The distal cervical, petrous, cavernous, supraclinoid segments of the left ICA appeared patent.  There is a posterior communicating artery supplying the PCA territory. There is a small 1.7mm infundibulum noted at the left PCOM origin. No obvious evidence of aneurysm. There is normal flow and branching noted in the left DANIEL and MCA territory.  The capillary and venous phases appear unremarkable.  Left external carotid artery - cerebral:  The left external carotid artery and its branches appear unremarkable.  There is no evidence of AV fistula.  Left subclavian artery - cervical:  Normal origin and cervical course of the left vertebral artery.    Left vertebral artery - cerebral:  Left vertebral artery is codominant.  Unremarkable distal cervical and intracranial segments of the left vertebral artery.  Left PICA, bilateral AICA, SCA, PCR visualized.  The basilar artery appears patent.  There is normal capillary and venous phase.      OVERALL IMPRESSION:  No evidence of aneurysm, AVM, AV fistula, stenosis.  Small 1.7mm left PCOM infundibulum.   Overall, unremarkable cerebral angiogram.        ______________________________  Eriberto Celis MD  Vascular and Interventional  Neurology

## 2023-10-04 NOTE — H&P
[facility] - Interventional Radiology  History & Physical    Subjective:      Chief Complaint/Reason for Admission: concern for intracranial aneurysms    Charmaine Mariano is a 23 y.o. female who is here for a Diagnostic cerebral angiogram due to concern for intracranial aneurysms. For full HPI please refer to the clinic note by Dr Celis     No acute events since the clinic visit. States that she continues to have occasional headaches but no passing out spells. She is off adderall now.     Past Medical History:   Diagnosis Date    ADHD     Anxiety disorder, unspecified     Depression     Hashimoto's disease     Hyperinsulinemia     PTSD (post-traumatic stress disorder)     Thyroiditis, unspecified      Past Surgical History:   Procedure Laterality Date    TEAR DUCT SURGERY       Family History   Problem Relation Age of Onset    Hypertension Mother     Hyperlipidemia Father     Diabetes Father     Alcohol abuse Father      Social History     Tobacco Use    Smoking status: Never    Smokeless tobacco: Never   Substance Use Topics    Alcohol use: Not Currently    Drug use: Never       (Not in a hospital admission)    Review of patient's allergies indicates:   Allergen Reactions    Bactrim [sulfamethoxazole-trimethoprim]         Review of Systems   Eyes:  Negative for blurred vision and double vision.   Neurological:  Positive for dizziness and headaches. Negative for speech change, focal weakness and loss of consciousness.       Objective:      Vital Signs (Most Recent)  Temp: 98.2 °F (36.8 °C) (10/04/23 0627)  Pulse: 70 (10/04/23 0627)  BP: (!) 93/55 (10/04/23 0627)  SpO2: 99 % (10/04/23 0627)    Vital Signs Range (Last 24H):  Temp:  [98.2 °F (36.8 °C)]   Pulse:  [70]   BP: (93)/(55)   SpO2:  [99 %]     Physical Exam  Alert & oriented x 3  EOMI, PERRLA, visual field intact in all 4 quadrants, no nystagmus or diplopia on lateral or vertical gaze.  Face symmetric, speech clear and fluent  Tongue midline, facial  sensation equal bilaterally  Strength 5/5 in bilateral upper and lower extremities   Sensation intact and equal bilaterally  Gait steady and balanced  Right groin/wrist stable without hematoma  Distal pulse intact. Skin warm.     Data Review:  CBC:   Lab Results   Component Value Date    WBC 4.54 10/04/2023    RBC 4.07 (L) 10/04/2023    HGB 13.8 10/04/2023    HCT 40.1 10/04/2023     10/04/2023     BMP:   Lab Results   Component Value Date     10/04/2023    K 4.3 10/04/2023    CO2 25 10/04/2023    BUN 13.9 10/04/2023    CREATININE 0.73 10/04/2023    CALCIUM 9.2 10/04/2023     Coagulation:   Lab Results   Component Value Date    INR 1.0 10/04/2023        Assessment:      23 F here for DSA due to concern for intracranial aneurysm.       Plan:    DSA

## 2023-10-04 NOTE — BRIEF OP NOTE
Name: Charmaine Mariano  MRN: [id number]  Age: 23 y.o.  Gender: female    Date of Procedure: 10/04/2023    Pre-operative Diagnosis: concern for right DANIEL, ICA aneurysms    Post-operative Diagnosis: no evidence of aneurysms    Procedure:   Diagnostic cerebral angiogram    Access/Closure:   Right common femoral artery access closed with manual compression    Surgeon: Eriberto eClis MD    Anesthesia: [place] and conscious sedation    EBL: min    Description of findings:    - no evidence of obvious intracranial aneurysms  - small left PCOM infundibulum measuring about 1.7mm.    Patient condition:   stable    Implant/specimen(s):  none    Plan:  - F/u in clinic in 3 weeks  - will repeat MRA head in 1 year  - no endovascular intervention indicated.  - overall, unremarkable cerebral angiogram without any stenosis/fistula/aneurysms    Eriberto Celis MD  Interventional Neurology

## 2023-10-09 ENCOUNTER — TELEPHONE (OUTPATIENT)
Dept: NEUROLOGY | Facility: CLINIC | Age: 23
End: 2023-10-09
Payer: COMMERCIAL

## 2023-10-10 DIAGNOSIS — R42 DIZZINESS, NONSPECIFIC: Primary | ICD-10-CM

## 2024-05-16 ENCOUNTER — HOSPITAL ENCOUNTER (EMERGENCY)
Facility: HOSPITAL | Age: 24
Discharge: HOME | End: 2024-05-16
Attending: EMERGENCY MEDICINE
Payer: COMMERCIAL

## 2024-05-16 VITALS
DIASTOLIC BLOOD PRESSURE: 61 MMHG | HEIGHT: 62 IN | BODY MASS INDEX: 17.11 KG/M2 | TEMPERATURE: 98 F | SYSTOLIC BLOOD PRESSURE: 97 MMHG | RESPIRATION RATE: 19 BRPM | HEART RATE: 69 BPM | OXYGEN SATURATION: 100 % | WEIGHT: 93 LBS

## 2024-05-16 DIAGNOSIS — N92.1 MENORRHAGIA WITH IRREGULAR CYCLE: Primary | ICD-10-CM

## 2024-05-16 LAB
ABORH RETYPE: NORMAL
ALBUMIN SERPL-MCNC: 4.7 G/DL (ref 3.5–5)
ALBUMIN/GLOB SERPL: 1.3 RATIO (ref 1.1–2)
ALP SERPL-CCNC: 43 UNIT/L (ref 40–150)
ALT SERPL-CCNC: 11 UNIT/L (ref 0–55)
AST SERPL-CCNC: 19 UNIT/L (ref 5–34)
B-HCG UR QL: NEGATIVE
BACTERIA #/AREA URNS AUTO: ABNORMAL /HPF
BASOPHILS # BLD AUTO: 0.02 X10(3)/MCL
BASOPHILS NFR BLD AUTO: 0.7 %
BILIRUB SERPL-MCNC: 0.6 MG/DL
BILIRUB UR QL STRIP.AUTO: NEGATIVE
BUN SERPL-MCNC: 14.7 MG/DL (ref 7–18.7)
CALCIUM SERPL-MCNC: 9.5 MG/DL (ref 8.4–10.2)
CHLORIDE SERPL-SCNC: 105 MMOL/L (ref 98–107)
CLARITY UR: CLEAR
CO2 SERPL-SCNC: 25 MMOL/L (ref 22–29)
COLOR UR AUTO: ABNORMAL
CREAT SERPL-MCNC: 0.92 MG/DL (ref 0.55–1.02)
EOSINOPHIL # BLD AUTO: 0.01 X10(3)/MCL (ref 0–0.9)
EOSINOPHIL NFR BLD AUTO: 0.4 %
ERYTHROCYTE [DISTWIDTH] IN BLOOD BY AUTOMATED COUNT: 12.1 % (ref 11.5–17)
GFR SERPLBLD CREATININE-BSD FMLA CKD-EPI: >60 ML/MIN/1.73/M2
GLOBULIN SER-MCNC: 3.7 GM/DL (ref 2.4–3.5)
GLUCOSE SERPL-MCNC: 83 MG/DL (ref 74–100)
GLUCOSE UR QL STRIP: NORMAL
GROUP & RH: NORMAL
HCT VFR BLD AUTO: 46.4 % (ref 37–47)
HGB BLD-MCNC: 15.9 G/DL (ref 12–16)
HGB UR QL STRIP: ABNORMAL
IMM GRANULOCYTES # BLD AUTO: 0 X10(3)/MCL (ref 0–0.04)
IMM GRANULOCYTES NFR BLD AUTO: 0 %
INDIRECT COOMBS: NORMAL
KETONES UR QL STRIP: NEGATIVE
LEUKOCYTE ESTERASE UR QL STRIP: NEGATIVE
LYMPHOCYTES # BLD AUTO: 1.02 X10(3)/MCL (ref 0.6–4.6)
LYMPHOCYTES NFR BLD AUTO: 36.4 %
MCH RBC QN AUTO: 33.7 PG (ref 27–31)
MCHC RBC AUTO-ENTMCNC: 34.3 G/DL (ref 33–36)
MCV RBC AUTO: 98.3 FL (ref 80–94)
MONOCYTES # BLD AUTO: 0.16 X10(3)/MCL (ref 0.1–1.3)
MONOCYTES NFR BLD AUTO: 5.7 %
MUCOUS THREADS URNS QL MICRO: ABNORMAL /LPF
NEUTROPHILS # BLD AUTO: 1.59 X10(3)/MCL (ref 2.1–9.2)
NEUTROPHILS NFR BLD AUTO: 56.8 %
NITRITE UR QL STRIP: NEGATIVE
NRBC BLD AUTO-RTO: 0 %
PH UR STRIP: 5.5 [PH]
PLATELET # BLD AUTO: 279 X10(3)/MCL (ref 130–400)
PMV BLD AUTO: 9.3 FL (ref 7.4–10.4)
POTASSIUM SERPL-SCNC: 3.8 MMOL/L (ref 3.5–5.1)
PROT SERPL-MCNC: 8.4 GM/DL (ref 6.4–8.3)
PROT UR QL STRIP: NEGATIVE
RBC # BLD AUTO: 4.72 X10(6)/MCL (ref 4.2–5.4)
RBC #/AREA URNS AUTO: ABNORMAL /HPF
SODIUM SERPL-SCNC: 136 MMOL/L (ref 136–145)
SP GR UR STRIP.AUTO: 1.02 (ref 1–1.03)
SPECIMEN OUTDATE: NORMAL
SQUAMOUS #/AREA URNS LPF: ABNORMAL /HPF
UROBILINOGEN UR STRIP-ACNC: NORMAL
WBC # SPEC AUTO: 2.8 X10(3)/MCL (ref 4.5–11.5)
WBC #/AREA URNS AUTO: ABNORMAL /HPF

## 2024-05-16 PROCEDURE — 86901 BLOOD TYPING SEROLOGIC RH(D): CPT | Performed by: PHYSICIAN ASSISTANT

## 2024-05-16 PROCEDURE — 99284 EMERGENCY DEPT VISIT MOD MDM: CPT | Mod: 25

## 2024-05-16 PROCEDURE — 81001 URINALYSIS AUTO W/SCOPE: CPT | Performed by: PHYSICIAN ASSISTANT

## 2024-05-16 PROCEDURE — 80053 COMPREHEN METABOLIC PANEL: CPT | Performed by: PHYSICIAN ASSISTANT

## 2024-05-16 PROCEDURE — 96374 THER/PROPH/DIAG INJ IV PUSH: CPT

## 2024-05-16 PROCEDURE — 85025 COMPLETE CBC W/AUTO DIFF WBC: CPT | Performed by: PHYSICIAN ASSISTANT

## 2024-05-16 PROCEDURE — 63600175 PHARM REV CODE 636 W HCPCS

## 2024-05-16 PROCEDURE — 81025 URINE PREGNANCY TEST: CPT | Performed by: PHYSICIAN ASSISTANT

## 2024-05-16 RX ORDER — IBUPROFEN 800 MG/1
800 TABLET ORAL 3 TIMES DAILY
Qty: 30 TABLET | Refills: 0 | Status: SHIPPED | OUTPATIENT
Start: 2024-05-16

## 2024-05-16 RX ORDER — ONDANSETRON 4 MG/1
4 TABLET, ORALLY DISINTEGRATING ORAL EVERY 8 HOURS PRN
Qty: 45 TABLET | Refills: 0 | Status: SHIPPED | OUTPATIENT
Start: 2024-05-16

## 2024-05-16 RX ORDER — ONDANSETRON HYDROCHLORIDE 2 MG/ML
4 INJECTION, SOLUTION INTRAVENOUS
Status: COMPLETED | OUTPATIENT
Start: 2024-05-16 | End: 2024-05-16

## 2024-05-16 RX ADMIN — ONDANSETRON 4 MG: 2 INJECTION INTRAMUSCULAR; INTRAVENOUS at 12:05

## 2024-05-16 RX ADMIN — SODIUM CHLORIDE, POTASSIUM CHLORIDE, SODIUM LACTATE AND CALCIUM CHLORIDE 1000 ML: 600; 310; 30; 20 INJECTION, SOLUTION INTRAVENOUS at 12:05

## 2024-05-16 NOTE — DISCHARGE INSTRUCTIONS
I called Dr. Deleon's office and they were waiting to hear back from the doctor.  Per the office, they will call you whenever they receive instruction to make a follow up.  You may call their office to check on the status.    Take Estradiol-dienogest daily for heavy menstrual cycles. Additionally, recommend Ibuprofen three times daily to help with decreasing bleeding.    For nausea, take Zofran as needed. Additionally, recommend increasing fluids intake and eating regular meals as tolerated.    Recommend follow up with Dr. Deleon for continued management of this problem.    Return to ER for worsening symptoms.

## 2024-05-16 NOTE — ED PROVIDER NOTES
Encounter Date: 5/16/2024       History     Chief Complaint   Patient presents with    Vaginal Bleeding     Pt states she has been having a menstrual cycle for 37 days. Pt having clotting, heavy bleeding, crampy with lower abd pain and nausea, dizziness.      See [facility] for details     The history is provided by the patient.     Review of patient's allergies indicates:   Allergen Reactions    Bactrim [sulfamethoxazole-trimethoprim]      Past Medical History:   Diagnosis Date    ADHD     Anxiety disorder, unspecified     Depression     Hashimoto's disease     Hyperinsulinemia     PTSD (post-traumatic stress disorder)     Thyroiditis, unspecified      Past Surgical History:   Procedure Laterality Date    TEAR DUCT SURGERY       Family History   Problem Relation Name Age of Onset    Hypertension Mother      Hyperlipidemia Father      Diabetes Father      Alcohol abuse Father       Social History     Tobacco Use    Smoking status: Never    Smokeless tobacco: Never   Substance Use Topics    Alcohol use: Not Currently    Drug use: Never     Review of Systems   Constitutional:  Negative for chills and fever.   Respiratory:  Negative for shortness of breath.    Cardiovascular:  Negative for chest pain.   Gastrointestinal:  Positive for nausea. Negative for abdominal pain and vomiting.   Genitourinary:  Positive for pelvic pain and vaginal bleeding. Negative for dysuria and vaginal discharge.   Neurological:  Positive for dizziness. Negative for syncope and weakness.   All other systems reviewed and are negative.      Physical Exam     Initial Vitals [05/16/24 1107]   BP Pulse Resp Temp SpO2   118/80 (!) 126 20 98 °F (36.7 °C) 100 %      MAP       --         Physical Exam    Nursing note and vitals reviewed.  Constitutional: She appears well-developed and well-nourished. No distress.   HENT:   Head: Normocephalic and atraumatic.   Eyes: Conjunctivae and EOM are normal.   Neck:   Normal range of motion.  Cardiovascular:  Normal  rate, regular rhythm and normal heart sounds.           Pulmonary/Chest: Breath sounds normal. No respiratory distress.   Abdominal: Abdomen is soft. There is no abdominal tenderness. There is no rebound and no guarding.   Genitourinary:    Genitourinary Comments: Declined     Musculoskeletal:         General: Normal range of motion.      Cervical back: Normal range of motion.     Neurological: She is alert and oriented to person, place, and time. GCS score is 15. GCS eye subscore is 4. GCS verbal subscore is 5. GCS motor subscore is 6.   Skin: Skin is warm and dry.   Psychiatric: She has a normal mood and affect. Thought content normal.         ED Course   Procedures  Labs Reviewed   COMPREHENSIVE METABOLIC PANEL - Abnormal; Notable for the following components:       Result Value    Protein Total 8.4 (*)     Globulin 3.7 (*)     All other components within normal limits   URINALYSIS, REFLEX TO URINE CULTURE - Abnormal; Notable for the following components:    Blood, UA 2+ (*)     Mucous, UA Trace (*)     All other components within normal limits   CBC WITH DIFFERENTIAL - Abnormal; Notable for the following components:    WBC 2.80 (*)     MCV 98.3 (*)     MCH 33.7 (*)     Neut # 1.59 (*)     All other components within normal limits   PREGNANCY TEST, URINE RAPID - Normal   CBC W/ AUTO DIFFERENTIAL    Narrative:     The following orders were created for panel order CBC auto differential.  Procedure                               Abnormality         Status                     ---------                               -----------         ------                     CBC with Differential[[id number]]       Abnormal            Final result                 Please view results for these tests on the individual orders.   TYPE & SCREEN   ABORH RETYPE          Imaging Results    None          Medications   lactated ringers bolus 1,000 mL (1,000 mLs Intravenous New Bag 5/16/24 1218)   ondansetron injection 4 mg (4 mg Intravenous  Given 5/16/24 1218)     Medical Decision Making  24-year-old G0 female presents to the ER for evaluation of vaginal bleeding times 37 days.  Patient reports the vaginal bleeding 1st began with her scheduled cycle.  She reports since then she has had unchanged vaginal bleeding.  The patient is unable to given estimation of blood loss as she states she wears menstrual cycle unaware and believes into the underwear.  She has not using any tampons or pads at this time.  She reports that the cycle is much heavier than her typical cycle.  She also reports some clot passage.  Patient reports that her OBGYN is Dr. JEANNE Deleon.  The patient reports that she did contact her physician and he sent her in a prescription of estradiol for 14 days which she did complete with no change in her symptoms.  She reports that she is beginning to experience some intermittent dizziness, nausea, feelings of weakness over the last few days.  She shares that she has not scheduled to see her OBGYN again until October of 2024.  The patient shares with me that she is messaged on the patient portal and has not heard back from the office recently.  The patient does report that she recently changed her daily medication use.  She has changed from Zoloft to Cymbalta.  She has started a new ADHD medication.  The patient reports that she also has a Nexplanon in place which has been present for multiple years.  She reports that she still has regular cycles and this is a new problem for her.    Workup today was grossly unremarkable-- no signs of acute process of hemorrhage.  I discussed this with the patient she verbalized understanding.  The patient had an unremarkable physical exam.  She was well appearing.  I do not believe that she requires any emergent Ob/gyn consultation or further inpatient workup.  She was given a L of fluids and Zofran with improvement of her symptoms.  She still has vaginal bleeding.  Discussed with the patient that this is most  likely secondary to her medication changes.  Recommend that she follows up with OBGYN.  The patient did request that I call the OBGYN office and see if they are able to make a follow up with her.  I did personally call the office and they stated that they would reach out to her to make a follow-up.  I did pass this message along to the patient.  For her bleeding, we will started on oral contraceptive daily.  We will also discharge home with ibuprofen which could possibly decrease her bleeding and also will help with some intermittent pelvic cramping she is experiencing.  Patient verbalized her understanding of the treatment plan and was discharged home.    Amount and/or Complexity of Data Reviewed  Labs: ordered. Decision-making details documented in ED Course.    Risk  Prescription drug management.      Additional MDM:   Differential Diagnosis:   Other: The following diagnoses were also considered and will be evaluated: Hemorrhage, UTI and Dehydration.            ED Course as of 05/16/24 1351   Thu May 16, 2024   1209 CBC grossly unremarkable.  No signs of hemorrhage or anemia [LM]   1209 CMP grossly unremarkable [LM]   1209 UA grossly unremarkable [LM]   1209 hCG Qualitative, Urine: Negative [LM]      ED Course User Index  [LM] Bettie Rodriguez PA                           Clinical Impression:  Final diagnoses:  [N92.1] Menorrhagia with irregular cycle (Primary)          ED Disposition Condition    Discharge Stable          ED Prescriptions       Medication Sig Dispense Start Date End Date Auth. Provider    estradiol valerate-dienogest (NATAZIA) 3 mg/2 mg-2 mg/ 2 mg-3 mg/1 mg Tab Take 1 tablet by mouth once daily. 30 tablet 5/16/2024 5/16/2025 Bettie Rodriguez PA    ibuprofen (ADVIL,MOTRIN) 800 MG tablet Take 1 tablet (800 mg total) by mouth 3 (three) times daily. 30 tablet 5/16/2024 -- Bettie Rodriguez PA    ondansetron (ZOFRAN-ODT) 4 MG TbDL Take 1 tablet (4 mg total) by mouth every 8 (eight) hours as needed (nausea).  45 tablet 5/16/2024 -- Bettie Rodriguez PA          Follow-up Information       Follow up With Specialties Details Why Contact Info    Zaire Deleon MD Obstetrics and Gynecology Call in 1 day for ER follow up [address] [id number] [phone]      Primary Care  Call in 1 day to get set up with primary care provider Call [phone] to set up primary care appointment if you do not have a primary care provider             Bettie Rodriguez PA  05/16/24 1001

## 2024-05-16 NOTE — FIRST PROVIDER EVALUATION
"Medical screening examination initiated.  I have conducted a focused provider triage encounter, findings are as follows:    Chief Complaint   Patient presents with    Vaginal Bleeding     Pt states she has been having a menstrual cycle for 37 days. Pt having clotting, heavy bleeding, crampy with lower abd pain and nausea, dizziness.      Brief history of present illness:  24 y.o. female presents to the ED with menorrhagia for the last 37 days. States OB Dr JEANNE Deleon sent in estradiol which she took however bleeding getting heavier. C/o lower abdominal pain, nausea, and weakness    Vitals:    05/16/24 1107   BP: 118/80   BP Location: Left arm   Patient Position: Sitting   Pulse: (!) 126   Resp: 20   Temp: 98 °F (36.7 °C)   TempSrc: Oral   SpO2: 100%   Weight: 42.2 kg (93 lb)   Height: 5' 2" (1.575 m)       Pertinent physical exam:  Awake, alert, ambulatory, non-labored respirations    Brief workup plan:  labs, UA    Preliminary workup initiated; this workup will be continued and followed by the physician or advanced practice provider that is assigned to the patient when roomed.  "